# Patient Record
Sex: MALE | Race: WHITE | NOT HISPANIC OR LATINO | URBAN - METROPOLITAN AREA
[De-identification: names, ages, dates, MRNs, and addresses within clinical notes are randomized per-mention and may not be internally consistent; named-entity substitution may affect disease eponyms.]

---

## 2018-03-06 ENCOUNTER — EMERGENCY (EMERGENCY)
Facility: HOSPITAL | Age: 48
LOS: 1 days | Discharge: LEFT BEFORE DISCHARGE | End: 2018-03-06
Admitting: EMERGENCY MEDICINE

## 2018-03-06 VITALS
OXYGEN SATURATION: 96 % | DIASTOLIC BLOOD PRESSURE: 78 MMHG | TEMPERATURE: 98 F | HEART RATE: 96 BPM | RESPIRATION RATE: 22 BRPM | SYSTOLIC BLOOD PRESSURE: 112 MMHG

## 2019-01-18 NOTE — ED ADULT TRIAGE NOTE - DIRECT TO ROOM CARE INITIATED:
PREOPERATIVE DIAGNOSIS:  Desires sterility    POSTOPERATIVE DIAGNOSIS:  Same    PROCEDURES:  Bilateral vasectomy    SURGEON:  Helio Lind MD    ANESTHESIA:  Local    ESTIMATED BLOOD LOSS:  1 mL.     SPECIMENS:  None.    OPERATIVE INDICATIONS:  Terrell Vences is a 43 year old male who desires permanent sterility.  Treatment options were discussed and he consented for surgery on an elective basis after discussion of the risks, benefits, and alternatives.    OPERATION PERFORMED:  Informed consent was obtained.  Terrell Vences was placed in the supine position and his genitals were prepped and draped in usual sterile fashion. I began by isolating the right vas deferens and pulling this up to the scrotal skin.  The overlying skin was infiltrated with 1% lidocaine and a sharp hemostat was used to open the skin.  A ring clamp was placed on the straight portion of the vas and the segment was electrosurgically cleared of surrounding vasal sheath.  The intervening vasal segment was excised with cautery and the lumen of both abdominal and testis ends were cauterized.  Fascial interposition was performed on the testis end with 3-0 chromic and the abdominal end pexed to the fascia.  Hemostasis was ensured and the incision covered with Steri-Strips.  The same procedure was performed on the left side.      He was given postoperative instructions and an ice pack.  He was encouraged to limit his activity over the next day and wear tightfitting underwear for the next few days.  He will return in 8 weeks to provide his first postvasectomy semen sample.   
06-Mar-2018 02:37

## 2021-04-01 ENCOUNTER — APPOINTMENT (OUTPATIENT)
Dept: DISASTER EMERGENCY | Facility: OTHER | Age: 51
End: 2021-04-01
Payer: COMMERCIAL

## 2021-04-01 PROCEDURE — 0011A: CPT

## 2021-04-28 ENCOUNTER — APPOINTMENT (OUTPATIENT)
Dept: COLORECTAL SURGERY | Facility: CLINIC | Age: 51
End: 2021-04-28
Payer: COMMERCIAL

## 2021-04-28 VITALS
TEMPERATURE: 97.7 F | RESPIRATION RATE: 16 BRPM | DIASTOLIC BLOOD PRESSURE: 72 MMHG | SYSTOLIC BLOOD PRESSURE: 116 MMHG | HEIGHT: 69 IN | HEART RATE: 83 BPM | BODY MASS INDEX: 25.92 KG/M2 | WEIGHT: 175 LBS

## 2021-04-28 DIAGNOSIS — Z87.891 PERSONAL HISTORY OF NICOTINE DEPENDENCE: ICD-10-CM

## 2021-04-28 DIAGNOSIS — F19.90 OTHER PSYCHOACTIVE SUBSTANCE USE, UNSPECIFIED, UNCOMPLICATED: ICD-10-CM

## 2021-04-28 DIAGNOSIS — Z78.9 OTHER SPECIFIED HEALTH STATUS: ICD-10-CM

## 2021-04-28 DIAGNOSIS — Z80.0 FAMILY HISTORY OF MALIGNANT NEOPLASM OF DIGESTIVE ORGANS: ICD-10-CM

## 2021-04-28 DIAGNOSIS — Z86.59 PERSONAL HISTORY OF OTHER MENTAL AND BEHAVIORAL DISORDERS: ICD-10-CM

## 2021-04-28 DIAGNOSIS — K64.5 PERIANAL VENOUS THROMBOSIS: ICD-10-CM

## 2021-04-28 DIAGNOSIS — K64.9 UNSPECIFIED HEMORRHOIDS: ICD-10-CM

## 2021-04-28 DIAGNOSIS — Z83.438 FAMILY HISTORY OF OTHER DISORDER OF LIPOPROTEIN METABOLISM AND OTHER LIPIDEMIA: ICD-10-CM

## 2021-04-28 DIAGNOSIS — R19.7 DIARRHEA, UNSPECIFIED: ICD-10-CM

## 2021-04-28 PROCEDURE — 99204 OFFICE O/P NEW MOD 45 MIN: CPT | Mod: 25

## 2021-04-28 PROCEDURE — 46600 DIAGNOSTIC ANOSCOPY SPX: CPT

## 2021-04-28 PROCEDURE — 99072 ADDL SUPL MATRL&STAF TM PHE: CPT

## 2021-04-28 RX ORDER — ZOLPIDEM TARTRATE 12.5 MG/1
12.5 TABLET, COATED ORAL
Refills: 0 | Status: ACTIVE | COMMUNITY

## 2021-04-28 RX ORDER — SERTRALINE HYDROCHLORIDE 100 MG/1
100 TABLET, FILM COATED ORAL
Refills: 0 | Status: ACTIVE | COMMUNITY

## 2021-04-28 RX ORDER — OMEPRAZOLE AND SODIUM BICARBONATE 20; 1100 MG/1; MG/1
CAPSULE ORAL
Refills: 0 | Status: ACTIVE | COMMUNITY

## 2021-04-28 NOTE — REVIEW OF SYSTEMS
[Diarrhea] : diarrhea [Negative] : Integumentary [Fever] : no fever [Eyesight Problems] : no eyesight problems [Shortness Of Breath] : no shortness of breath [Abdominal Pain] : no abdominal pain [Constipation] : no constipation [Dysuria] : no dysuria

## 2021-04-28 NOTE — HISTORY OF PRESENT ILLNESS
[FreeTextEntry1] : Here for evlauation of hemorrhoids\par \par About a month ago, he had diarrhea for 2-3 weeks straight.  Made his rectal area sore and sensitive. Used baby wipes.  Last week, after wiping, he felt as if he could not completely clean himself.  He noticed 2 bumps in the area. He saw Dr. Paz yesterday, he thought this was related to hemorrhoids but came to me to make sure that was all it was.  Was given topical hydrocortisone.  No prior hemorrhoid issues.  Diarrhea is 3-4x and has not really improved over the past 2-3 weeks.  Has sensation of incomplete emptying after a BM.  Consistency has varied from loose to watery.  No blood or mucus in stool.  No incontinence.  Rectal soreness has improved somewhat.  Has not taken OTC anti-diarrhea medications. No fevers or trouble urinating.  No drainage or bleeding from the bumps.  This morning started the hydrocortisone. Pt also told to start high fiber diet. No nausea/vomiting or abdominal pain.   No dietary changes.  No recent antibiotic use.\par \par Last colonoscopy was 1 year ago for screening, which was normal.  Uncle with colon cancer\par \par PMH \par s/p shoulder surgery\par depression\par \par Meds\par Zegrid, Zoloft\par \par NKDA\par \par Former smoker quit 10 years ago, does use THC\par \par Father with history of myelofibrosis

## 2021-04-28 NOTE — CONSULT LETTER
[Dear  ___] : Dear  [unfilled], [Consult Letter:] : I had the pleasure of evaluating your patient, [unfilled]. [( Thank you for referring [unfilled] for consultation for _____ )] : Thank you for referring [unfilled] for consultation for [unfilled] [Please see my note below.] : Please see my note below. [Consult Closing:] : Thank you very much for allowing me to participate in the care of this patient.  If you have any questions, please do not hesitate to contact me. [Sincerely,] : Sincerely, [FreeTextEntry2] : Dami Paz MD [FreeTextEntry3] : Jaxon Lal MD FACS

## 2021-04-28 NOTE — ASSESSMENT
[FreeTextEntry1] : Thrombosed external hemorrhoid\par --1cm thrombosed external hemorrhoid anterior midline and smaller one in RPQ appreciated on anoscopy today.  Pt is not tender and is not limited in activity.  It has been there for at least a week now.  Given that patient is still having diarrhea, I would recommend observtaion for now since excision will result in open wound there.\par --continue hydrocortisone as needed for about 1-2 weeks if still irritated.  Can consider sitz baths as needed.\par --It can ulcerate and the blood clot may extrude itself and manifest as "bleeding".  This is usually minor.\par --call office if bleeding is severe or if worsening pain , fevers or trouble urinating.  This may indicate in fection and warrants urgent attention\par --follow up as needed\par \par Diarrhea\par --may be related to IBS stress given no recent antibiotic exposure or travel exposure.\par --ok to start fiber supplements.

## 2021-04-28 NOTE — PROCEDURE
[FreeTextEntry1] : Anoscopy recommended as part of further workup.  Risks include bleeding and discomfort discussed.  Questions answered.  Pt provides verbal consent.\par \par Well lubricated anoscope inserted into anus.  All 4 quadrants examined.\par \par Anoscopy shows small internal hemorrhoids, not inflammed.  small 3-4mm thrombosed hemorrhoid RPQ, and a 1cm thrombosed external  hemorrhoid anterior mildine in distal anal canal. no evidence of ulceration, nontender to exam \par \par Pt tolerated procedure.\par \par

## 2021-04-28 NOTE — PHYSICAL EXAM
[de-identified] : ND soft NT [de-identified] : No perianal erythema or induration.  small external hemorhroids, no tags.  ISAEL shows 1cm lump anteriorly.  nontender.  no gross blood. no stenosis.

## 2021-04-29 ENCOUNTER — APPOINTMENT (OUTPATIENT)
Dept: DISASTER EMERGENCY | Facility: OTHER | Age: 51
End: 2021-04-29
Payer: COMMERCIAL

## 2021-04-29 PROCEDURE — 0012A: CPT

## 2022-09-27 ENCOUNTER — NON-APPOINTMENT (OUTPATIENT)
Age: 52
End: 2022-09-27

## 2023-04-20 ENCOUNTER — APPOINTMENT (OUTPATIENT)
Dept: ORTHOPEDIC SURGERY | Facility: CLINIC | Age: 53
End: 2023-04-20
Payer: COMMERCIAL

## 2023-04-20 VITALS — BODY MASS INDEX: 25.92 KG/M2 | WEIGHT: 175 LBS | HEIGHT: 69 IN

## 2023-04-20 DIAGNOSIS — M54.16 RADICULOPATHY, LUMBAR REGION: ICD-10-CM

## 2023-04-20 PROCEDURE — 99204 OFFICE O/P NEW MOD 45 MIN: CPT

## 2023-04-20 PROCEDURE — 73502 X-RAY EXAM HIP UNI 2-3 VIEWS: CPT

## 2023-04-20 PROCEDURE — 72100 X-RAY EXAM L-S SPINE 2/3 VWS: CPT

## 2023-04-20 RX ORDER — METHYLPREDNISOLONE 4 MG/1
4 TABLET ORAL
Qty: 1 | Refills: 0 | Status: ACTIVE | COMMUNITY
Start: 2023-04-20 | End: 1900-01-01

## 2023-04-20 RX ORDER — LIDOCAINE 40 MG/G
4 PATCH TOPICAL
Qty: 1 | Refills: 2 | Status: ACTIVE | COMMUNITY
Start: 2023-04-20 | End: 1900-01-01

## 2023-04-20 NOTE — HISTORY OF PRESENT ILLNESS
[de-identified] : (History of Present Illness)\par \par Patient age in years is 52\par Occupation is IT consulting \par \par Body part causing symptoms is the lower back\par Symptoms began  3 weeks ago during skiing and he landed on his back\par Location of pain is posterior left side\par Quality of pain is sharp\par Pain score at rest is 5\par Pain score during activity is 9\par Radicular symptoms are present into the groin \par Prior treatments include Mobic, flexeril\par Patient's condition is not associated with worker’s compensation, no-fault or interscholastic athletics\par Patient reports testicular discomfort and has an appointment with urology, has history of epidydimitis

## 2023-04-20 NOTE — IMAGING
[de-identified] : (Physical Exam: Lumbar Spine)\par \par Laterality is bilateral\par \par Patient is in no acute distress, alert and oriented\par Capillary refill is less than 2 seconds in the toes\par Lymphadenopathy is not present\par Peripheral edema is not present\par \par Skin is intact \par Swelling is not present \par Atrophy is not present\par Antalgic gait is present \par \par Bony tenderness is not present \par Paraspinal tenderness is present on left\par Hip tenderness is not present\par Bony stepoff is not present\par \par Range of motion is normal\par \par Hip flexion strength is 5/5\par Hip abduction strength is 5/5\par Knee extension strength is 5/5\par Knee flexion strength is 5/5\par Ankle dorsiflexion strength is 5/5\par Ankle plantarflexion strength is 5/5\par Great toe dorsiflexion strength is 5/5\par Great toe plantarflexion strength is 5/5\par \par L3 sensation is intact\par L4 sensation is intact\par L5 sensation is intact\par S1 sensation is intact\par \par Patellar reflex is 2+\par Ankle jerk reflex is 2+\par \par Clonus is not present\par Alvarez's test is not present \par Straight leg raise is normal\par  [No bony abnormalities] : No bony abnormalities [Straightening consistent with spasm] : Straightening consistent with spasm [No spinal deformity, fracture, lytic lesion, or marked single level collapse] : No spinal deformity, fracture, lytic lesion, or marked single level collapse [All Views] : anteroposterior, lateral [There are no fractures, subluxations or dislocations. No significant abnormalities are seen] : There are no fractures, subluxations or dislocations. No significant abnormalities are seen

## 2023-04-20 NOTE — DISCUSSION/SUMMARY
[de-identified] : (Assessment and Plan)\par \par History, clinical examination and imaging were most consistent with:\par -lumbar paraspinal strain and radiculopathy\par \par The diagnosis was explained in detail. The potential non-surgical and surgical treatments were reviewed. The relative risks and benefits of each option were considered relative to the patient’s age, activity level, medical history, symptom severity and previously attempted treatments. \par \par -Non-surgical treatment was selected. \par -Patient will proceed with formal PT.\par -Medrol dose pack will be prescribed for symptom relief.\par -The added clinical utility of an MRI was discussed. The patient deferred further diagnostic testing at this time.\par -patient will pursue further follow up care for his testicular symptoms from his urologist.\par -Follow up in 6 to 8 weeks if symptoms persist or fail to improve. consider lumbar MRI at that time. \par \par \par (MDM) \par \par Problem Complexity\par -Moderate: chronic illness with exacerbation\par \par Risk\par -Moderate: prescription medication\par \par -Patient has not been seen by another provider in my practice within the past 2 years who specializes in orthopedic sports medicine, shoulder or elbow surgery. \par \par The patient was advised to consult with their primary medical provider prior to initiation of any new medications to reduce the risk of adverse effects specific to their long-term home medications and medical history. The risk of gastrointestinal irritation and kidney injury specific to long-term NSAID use was discussed.   \par \par

## 2023-05-19 ENCOUNTER — TRANSCRIPTION ENCOUNTER (OUTPATIENT)
Age: 53
End: 2023-05-19

## 2023-07-13 ENCOUNTER — APPOINTMENT (OUTPATIENT)
Dept: UROLOGY | Facility: CLINIC | Age: 53
End: 2023-07-13

## 2024-08-16 ENCOUNTER — APPOINTMENT (OUTPATIENT)
Dept: ORTHOPEDIC SURGERY | Facility: CLINIC | Age: 54
End: 2024-08-16
Payer: COMMERCIAL

## 2024-08-16 VITALS — BODY MASS INDEX: 28.14 KG/M2 | WEIGHT: 190 LBS | HEIGHT: 69 IN

## 2024-08-16 DIAGNOSIS — M77.8 OTHER ENTHESOPATHIES, NOT ELSEWHERE CLASSIFIED: ICD-10-CM

## 2024-08-16 PROCEDURE — J3490M: CUSTOM

## 2024-08-16 PROCEDURE — 99204 OFFICE O/P NEW MOD 45 MIN: CPT | Mod: 25

## 2024-08-16 PROCEDURE — 73030 X-RAY EXAM OF SHOULDER: CPT | Mod: RT

## 2024-08-16 PROCEDURE — 20611 DRAIN/INJ JOINT/BURSA W/US: CPT | Mod: RT

## 2024-08-16 RX ORDER — MELOXICAM 15 MG/1
15 TABLET ORAL
Qty: 30 | Refills: 0 | Status: ACTIVE | COMMUNITY
Start: 2024-08-16 | End: 1900-01-01

## 2024-08-16 NOTE — HISTORY OF PRESENT ILLNESS
[Gradual] : gradual [7] : 7 [Burning] : burning [Dull/Aching] : dull/aching [Localized] : localized [Shooting] : shooting [Constant] : constant [Ice] : ice [Full time] : Work status: full time [de-identified] : 54 year old RHD male with pain in the right shoulder, symptoms started about ten days or so ago, no specific injury. Worse with reaching, lifting, rotation, occasionally when sleeping. No radicular complaints. he had surgery on the right shoulder about 4 years ago by another provider. He was doing relatively well until this incident. 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

## 2024-08-16 NOTE — PHYSICAL EXAM
[Sitting] : sitting [5___] : external rotation 5[unfilled]/5 [Right] : right shoulder [There are no fractures, subluxations or dislocations. No significant abnormalities are seen] : There are no fractures, subluxations or dislocations. No significant abnormalities are seen [] : healed incision [TWNoteComboBox7] : active forward flexion 170 degrees [TWNoteComboBox6] : internal rotation L4 [de-identified] : external rotation 60 degrees

## 2024-08-16 NOTE — DISCUSSION/SUMMARY
[de-identified] : modify activities use elastic sleeve for structural support try OTC meds ice as needed try topical lidocaine for pain control reviewed current medications used by this patient home exercises for functional return popss MRA in future if no resolution

## 2024-08-28 RX ORDER — DICLOFENAC SODIUM 75 MG/1
75 TABLET, DELAYED RELEASE ORAL TWICE DAILY
Qty: 60 | Refills: 0 | Status: ACTIVE | COMMUNITY
Start: 2024-08-28 | End: 1900-01-01

## 2024-09-06 ENCOUNTER — APPOINTMENT (OUTPATIENT)
Dept: ORTHOPEDIC SURGERY | Facility: CLINIC | Age: 54
End: 2024-09-06
Payer: COMMERCIAL

## 2024-09-06 VITALS — WEIGHT: 190 LBS | HEIGHT: 69 IN | BODY MASS INDEX: 28.14 KG/M2

## 2024-09-06 DIAGNOSIS — M77.8 OTHER ENTHESOPATHIES, NOT ELSEWHERE CLASSIFIED: ICD-10-CM

## 2024-09-06 PROCEDURE — 99213 OFFICE O/P EST LOW 20 MIN: CPT

## 2024-09-06 NOTE — HISTORY OF PRESENT ILLNESS
[Dull/Aching] : dull/aching [Localized] : localized [Constant] : constant [Sleep] : sleep [Lying in bed] : lying in bed [de-identified] :  pain in the right shoulder, symptoms started persist, csi with temp help, meds do help. , no specific injury. Worse with reaching, lifting, rotation, occasionally when sleeping.Had surgery on the right shoulder about 4 years ago by another provider.  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

## 2024-09-06 NOTE — DISCUSSION/SUMMARY
[de-identified] : modify activities use elastic sleeve for structural support try OTC meds ice as needed try topical lidocaine for pain control reviewed current medications used by this patient home exercises for functional return  RE:  JEFFREY BLOCH   [id number]   Attention:  Nurse Reviewer /Medical Director    Based on my patient's condition, I strongly believe that the MRI arth R shoulder is medically.necessary.   The patient has failed oral meds, injections and PT and conservative treatment in combination or by themselves and therefore needs the MRI.   The MRI will dictate further treatment t recommendations.

## 2024-09-06 NOTE — PHYSICAL EXAM
[Right] : right shoulder [Sitting] : sitting [5___] : external rotation 5[unfilled]/5 [] : motor and sensory intact distally [TWNoteComboBox7] : active forward flexion 170 degrees [TWNoteComboBox6] : internal rotation L4 [de-identified] : external rotation 60 degrees

## 2024-09-13 ENCOUNTER — APPOINTMENT (OUTPATIENT)
Dept: MRI IMAGING | Facility: CLINIC | Age: 54
End: 2024-09-13

## 2024-09-13 PROCEDURE — 73223 MRI JOINT UPR EXTR W/O&W/DYE: CPT | Mod: RT

## 2024-09-13 PROCEDURE — 23350 INJECTION FOR SHOULDER X-RAY: CPT | Mod: 59,RT

## 2024-10-04 ENCOUNTER — APPOINTMENT (OUTPATIENT)
Dept: ORTHOPEDIC SURGERY | Facility: CLINIC | Age: 54
End: 2024-10-04
Payer: COMMERCIAL

## 2024-10-04 VITALS — BODY MASS INDEX: 28.14 KG/M2 | HEIGHT: 69 IN | WEIGHT: 190 LBS

## 2024-10-04 DIAGNOSIS — S43.431D SUPERIOR GLENOID LABRUM LESION OF RIGHT SHOULDER, SUBSEQUENT ENCOUNTER: ICD-10-CM

## 2024-10-04 DIAGNOSIS — M75.111 INCOMPLETE ROTATOR CUFF TEAR OR RUPTURE OF RIGHT SHOULDER, NOT SPECIFIED AS TRAUMATIC: ICD-10-CM

## 2024-10-04 DIAGNOSIS — M67.911 UNSPECIFIED DISORDER OF SYNOVIUM AND TENDON, RIGHT SHOULDER: ICD-10-CM

## 2024-10-04 PROCEDURE — 99213 OFFICE O/P EST LOW 20 MIN: CPT

## 2024-10-04 NOTE — DATA REVIEWED
[MRI] : MRI [Right] : of the right [Shoulder] : shoulder [Report was reviewed and noted in the chart] : The report was reviewed and noted in the chart [I reviewed the films/CD and agree] : I reviewed the films/CD and agree [FreeTextEntry1] : labral tear, rot interval tear and IS

## 2024-10-04 NOTE — PHYSICAL EXAM
[Right] : right shoulder [Sitting] : sitting [5___] : external rotation 5[unfilled]/5 [] : motor and sensory intact distally [TWNoteComboBox7] : active forward flexion 170 degrees [TWNoteComboBox6] : internal rotation L4 [de-identified] : external rotation 60 degrees

## 2024-10-04 NOTE — HISTORY OF PRESENT ILLNESS
[Dull/Aching] : dull/aching [Localized] : localized [Full time] : Work status: full time [de-identified] : pain in the right shoulder, symptoms persist, csi with temp help, meds do help., no specific injury. Worse with reaching, lifting, rotation, occasionally when sleeping. Had surgery on the right shoulder about 4 years ago by another provider. 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

## 2024-10-04 NOTE — DISCUSSION/SUMMARY
[de-identified] : Patient allowed to gently start resuming activities. Discussed change to medication prescription and usage. Bracing options discussed with patient for stability and support. Activity modification as needed Discussed poss future surgery, pt deciding, questions answered, no guarantees try topical lidocaine for pain control reviewed current medications used by this patient Home exercises for functional return 10/04/2024    RE:  JEFFREY BLOCH   [facility] #- [id number]    Attention:  Nurse Reviewer /Medical Director  I am writing this letter as a medical necessity for PT program. Patient has tried analgesics, non-steroid anti-inflammatory agents,  hot or cold compresses,injections of corticosteroids, etc)  which in combination or by themselves has not worked. Based on my patient's condition, I strongly believe that the PT is medically needed.   Thank you for your time and consideration.

## 2024-11-15 ENCOUNTER — APPOINTMENT (OUTPATIENT)
Dept: ORTHOPEDIC SURGERY | Facility: CLINIC | Age: 54
End: 2024-11-15

## 2025-01-03 ENCOUNTER — NON-APPOINTMENT (OUTPATIENT)
Age: 55
End: 2025-01-03